# Patient Record
Sex: MALE | Race: WHITE | Employment: FULL TIME | URBAN - METROPOLITAN AREA
[De-identification: names, ages, dates, MRNs, and addresses within clinical notes are randomized per-mention and may not be internally consistent; named-entity substitution may affect disease eponyms.]

---

## 2017-01-03 ENCOUNTER — OFFICE VISIT (OUTPATIENT)
Dept: GASTROENTEROLOGY | Facility: CLINIC | Age: 52
End: 2017-01-03

## 2017-01-03 VITALS
DIASTOLIC BLOOD PRESSURE: 77 MMHG | SYSTOLIC BLOOD PRESSURE: 127 MMHG | HEIGHT: 69 IN | WEIGHT: 171 LBS | BODY MASS INDEX: 25.33 KG/M2 | HEART RATE: 61 BPM

## 2017-01-03 DIAGNOSIS — Z12.11 COLON CANCER SCREENING: Primary | ICD-10-CM

## 2017-01-03 PROCEDURE — 99243 OFF/OP CNSLTJ NEW/EST LOW 30: CPT | Performed by: INTERNAL MEDICINE

## 2017-01-03 PROCEDURE — 99212 OFFICE O/P EST SF 10 MIN: CPT | Performed by: INTERNAL MEDICINE

## 2017-01-03 NOTE — PROGRESS NOTES
Suresh Og is a 46year old male. HPI:   Patient presents with:  Colonoscopy Screening: No current complaints.  No prior colon      The patient is a 19-year-old male with a history of prior heavy alcohol, hemochromatosis who presents for colon ca organosplenomegly  Ext- no clubbing or cyanosis  Skin- no rashes or lesions  Neuro- appropriate response, alert, no confusion  .   ASSESSMENT/PLAN:   Assessment  Colon cancer screening    The patient is a 78-year-old male who appears to be of average risk f

## 2017-01-20 ENCOUNTER — OFFICE VISIT (OUTPATIENT)
Dept: FAMILY MEDICINE CLINIC | Facility: CLINIC | Age: 52
End: 2017-01-20

## 2017-01-20 VITALS
DIASTOLIC BLOOD PRESSURE: 78 MMHG | HEART RATE: 69 BPM | SYSTOLIC BLOOD PRESSURE: 134 MMHG | WEIGHT: 172 LBS | BODY MASS INDEX: 25 KG/M2 | TEMPERATURE: 98 F

## 2017-01-20 DIAGNOSIS — M54.2 CERVICALGIA: Primary | ICD-10-CM

## 2017-01-20 PROCEDURE — 99212 OFFICE O/P EST SF 10 MIN: CPT | Performed by: FAMILY MEDICINE

## 2017-01-20 PROCEDURE — 99213 OFFICE O/P EST LOW 20 MIN: CPT | Performed by: FAMILY MEDICINE

## 2017-01-20 RX ORDER — BACLOFEN 10 MG/1
10 TABLET ORAL 2 TIMES DAILY
Qty: 20 TABLET | Refills: 0 | Status: SHIPPED | OUTPATIENT
Start: 2017-01-20 | End: 2018-05-08

## 2017-01-20 NOTE — PROGRESS NOTES
HPI:    Patient ID: Yinka Leos is a 46year old male. HPI  Patient presents with:  Neck Pain: c/o left side neck pain    Review of Systems   Constitutional: Negative. Musculoskeletal: Positive for neck pain.    Neurological: Negative for weakn

## 2017-03-10 ENCOUNTER — ANESTHESIA (OUTPATIENT)
Dept: ENDOSCOPY | Facility: HOSPITAL | Age: 52
End: 2017-03-10
Payer: COMMERCIAL

## 2017-03-10 ENCOUNTER — SURGERY (OUTPATIENT)
Age: 52
End: 2017-03-10

## 2017-03-10 ENCOUNTER — HOSPITAL ENCOUNTER (OUTPATIENT)
Facility: HOSPITAL | Age: 52
Setting detail: HOSPITAL OUTPATIENT SURGERY
Discharge: HOME | End: 2017-03-10
Attending: INTERNAL MEDICINE | Admitting: INTERNAL MEDICINE
Payer: COMMERCIAL

## 2017-03-10 ENCOUNTER — ANESTHESIA EVENT (OUTPATIENT)
Dept: ENDOSCOPY | Facility: HOSPITAL | Age: 52
End: 2017-03-10
Payer: COMMERCIAL

## 2017-03-10 VITALS
RESPIRATION RATE: 14 BRPM | HEART RATE: 55 BPM | WEIGHT: 170 LBS | OXYGEN SATURATION: 98 % | SYSTOLIC BLOOD PRESSURE: 102 MMHG | HEIGHT: 68 IN | BODY MASS INDEX: 25.76 KG/M2 | DIASTOLIC BLOOD PRESSURE: 60 MMHG | TEMPERATURE: 97 F

## 2017-03-10 DIAGNOSIS — Z12.11 ENCOUNTER FOR SCREENING COLONOSCOPY: Primary | ICD-10-CM

## 2017-03-10 PROCEDURE — 45385 COLONOSCOPY W/LESION REMOVAL: CPT | Performed by: INTERNAL MEDICINE

## 2017-03-10 PROCEDURE — 0DBN8ZX EXCISION OF SIGMOID COLON, VIA NATURAL OR ARTIFICIAL OPENING ENDOSCOPIC, DIAGNOSTIC: ICD-10-PCS | Performed by: INTERNAL MEDICINE

## 2017-03-10 RX ORDER — SODIUM CHLORIDE, SODIUM LACTATE, POTASSIUM CHLORIDE, CALCIUM CHLORIDE 600; 310; 30; 20 MG/100ML; MG/100ML; MG/100ML; MG/100ML
INJECTION, SOLUTION INTRAVENOUS CONTINUOUS
Status: DISCONTINUED | OUTPATIENT
Start: 2017-03-10 | End: 2017-03-10

## 2017-03-10 RX ORDER — MIDAZOLAM HYDROCHLORIDE 1 MG/ML
1 INJECTION INTRAMUSCULAR; INTRAVENOUS EVERY 5 MIN PRN
Status: DISCONTINUED | OUTPATIENT
Start: 2017-03-10 | End: 2017-03-10

## 2017-03-10 RX ORDER — SODIUM CHLORIDE 0.9 % (FLUSH) 0.9 %
10 SYRINGE (ML) INJECTION AS NEEDED
Status: DISCONTINUED | OUTPATIENT
Start: 2017-03-10 | End: 2017-03-10

## 2017-03-10 RX ORDER — SODIUM CHLORIDE, SODIUM LACTATE, POTASSIUM CHLORIDE, CALCIUM CHLORIDE 600; 310; 30; 20 MG/100ML; MG/100ML; MG/100ML; MG/100ML
INJECTION, SOLUTION INTRAVENOUS CONTINUOUS PRN
Status: DISCONTINUED | OUTPATIENT
Start: 2017-03-10 | End: 2017-03-10 | Stop reason: SURG

## 2017-03-10 RX ORDER — ONDANSETRON 2 MG/ML
4 INJECTION INTRAMUSCULAR; INTRAVENOUS ONCE AS NEEDED
Status: DISCONTINUED | OUTPATIENT
Start: 2017-03-10 | End: 2017-03-10

## 2017-03-10 RX ADMIN — SODIUM CHLORIDE, SODIUM LACTATE, POTASSIUM CHLORIDE, CALCIUM CHLORIDE: 600; 310; 30; 20 INJECTION, SOLUTION INTRAVENOUS at 09:26:00

## 2017-03-10 RX ADMIN — SODIUM CHLORIDE, SODIUM LACTATE, POTASSIUM CHLORIDE, CALCIUM CHLORIDE: 600; 310; 30; 20 INJECTION, SOLUTION INTRAVENOUS at 09:57:00

## 2017-03-10 RX ADMIN — SODIUM CHLORIDE, SODIUM LACTATE, POTASSIUM CHLORIDE, CALCIUM CHLORIDE: 600; 310; 30; 20 INJECTION, SOLUTION INTRAVENOUS at 09:25:00

## 2017-03-10 NOTE — H&P
History & Physical Examination    Patient Name: Helen Borden  MRN: [id number]  CSN: [id number]  YOB: 1965    Diagnosis: colon screening    Present Illness: see chart      Prescriptions prior to admission:  baclofen 10 MG Oral Tab Take 1

## 2017-03-10 NOTE — ANESTHESIA POSTPROCEDURE EVALUATION
Patient: Arslan Traylor [facility]    Procedure Summary     Date Anesthesia Start Anesthesia Stop Room / Location    03/10/17 0926  [facility] ENDOSCOPY 01 / [facility] ENDOSCOPY       Procedure Diagnosis Surgeon Responsible Provider    COLONOSCOPY (N/A ) Special screening for

## 2017-03-10 NOTE — ANESTHESIA PREPROCEDURE EVALUATION
Anesthesia PreOp Note    HPI:     Sebastián Bobo is a 46year old male who presents for preoperative consultation requested by: Olivia Gtz MD    Date of Surgery: 3/10/2017    Procedure(s):  COLONOSCOPY  Indication: Special screening for [facility] Day Smoker  1.00 Packs/Day     Types: Cigarettes    Smokeless tobacco: Not on file    Alcohol Use: No    Drug Use: No    Sexual Activity: Not on file   Not on file  Other Topics Concern    Caffeine Concern Not Asked    Comment: coffee, 4cups/day     Social

## 2017-03-10 NOTE — OPERATIVE REPORT
[facility] HOSP - [facility] Endoscopy Report      Preoperative Diagnosis:  - colon screening      Postoperative Diagnosis:  - small external anal lesion  - diverticulosis  - colon polyp x1   - hemorrhoids      Procedure:    Colonoscopy         Surgeon:  Citlalli Erazo

## 2017-03-15 RX ORDER — SIMVASTATIN 40 MG
TABLET ORAL
Qty: 30 TABLET | Refills: 0 | Status: SHIPPED | OUTPATIENT
Start: 2017-03-15 | End: 2018-08-09

## 2017-03-15 NOTE — TELEPHONE ENCOUNTER
Anna/Dax[facility] [phone] is calling for status of medication refill request. Pt is out of medication.

## 2017-03-20 ENCOUNTER — TELEPHONE (OUTPATIENT)
Dept: GASTROENTEROLOGY | Facility: CLINIC | Age: 52
End: 2017-03-20

## 2017-03-20 NOTE — TELEPHONE ENCOUNTER
----- Message from Yesenia Shaw MD sent at 3/17/2017  6:36 PM CDT -----  RN to place on call back for colonoscopy for 10 years and mail letter to the pt.

## 2017-05-08 ENCOUNTER — OFFICE VISIT (OUTPATIENT)
Dept: DERMATOLOGY CLINIC | Facility: CLINIC | Age: 52
End: 2017-05-08

## 2017-05-08 DIAGNOSIS — D23.60 BENIGN NEOPLASM OF SKIN OF UPPER LIMB, INCLUDING SHOULDER, UNSPECIFIED LATERALITY: ICD-10-CM

## 2017-05-08 DIAGNOSIS — D23.70 BENIGN NEOPLASM OF SKIN OF LOWER LIMB, INCLUDING HIP, UNSPECIFIED LATERALITY: ICD-10-CM

## 2017-05-08 DIAGNOSIS — D23.5 BENIGN NEOPLASM OF SKIN OF TRUNK, EXCEPT SCROTUM: ICD-10-CM

## 2017-05-08 DIAGNOSIS — D23.30 BENIGN NEOPLASM OF SKIN OF FACE: ICD-10-CM

## 2017-05-08 DIAGNOSIS — D22.9 MULTIPLE NEVI: Primary | ICD-10-CM

## 2017-05-08 DIAGNOSIS — D23.4 BENIGN NEOPLASM OF SCALP AND SKIN OF NECK: ICD-10-CM

## 2017-05-08 PROCEDURE — 99202 OFFICE O/P NEW SF 15 MIN: CPT | Performed by: DERMATOLOGY

## 2017-05-08 PROCEDURE — 99212 OFFICE O/P EST SF 10 MIN: CPT | Performed by: DERMATOLOGY

## 2017-05-24 NOTE — PROGRESS NOTES
Diego Mora is a 46year old male. HPI:     CC:  Patient presents with:  Lesion: new pt. presents with bumpy lesions to scalp, nose and penis. no discomfort.  personal hx of BCC        Allergies:  Review of patient's allergies indicates no known all Packs/Day     Types: Cigarettes    Smokeless tobacco: Not on file    Alcohol Use: No    Drug Use: No    Sexual Activity: Not on file   Not on file  Other Topics Concern    Caffeine Concern Not Asked    Comment: coffee, 4cups/day    Pt has a pacemaker No plan:    No orders of the defined types were placed in this encounter.        Meds & Refills for this Visit:  No prescriptions requested or ordered in this encounter      Multiple nevi  (primary encounter diagnosis)  Benign neoplasm of scalp and skin of nec

## 2018-05-08 ENCOUNTER — OFFICE VISIT (OUTPATIENT)
Dept: FAMILY MEDICINE CLINIC | Facility: CLINIC | Age: 53
End: 2018-05-08

## 2018-05-08 VITALS
SYSTOLIC BLOOD PRESSURE: 130 MMHG | TEMPERATURE: 98 F | DIASTOLIC BLOOD PRESSURE: 80 MMHG | WEIGHT: 174 LBS | HEART RATE: 70 BPM | BODY MASS INDEX: 26 KG/M2

## 2018-05-08 DIAGNOSIS — G44.40 REBOUND HEADACHE: Primary | ICD-10-CM

## 2018-05-08 DIAGNOSIS — F17.210 CIGARETTE NICOTINE DEPENDENCE WITHOUT COMPLICATION: ICD-10-CM

## 2018-05-08 PROCEDURE — 99214 OFFICE O/P EST MOD 30 MIN: CPT | Performed by: FAMILY MEDICINE

## 2018-05-08 PROCEDURE — 99212 OFFICE O/P EST SF 10 MIN: CPT | Performed by: FAMILY MEDICINE

## 2018-05-08 NOTE — PROGRESS NOTES
2018 11:37 AM    Felix Jett YOB: 1965  Patient presents with:  Headache: X 1 month  Sore Throat: pt states its irritated    HPI:     Td Castro is a 46year old male who presents for evaluation of a chief complaint of sore throat Surgical History: Past Surgical History:  3/10/2017: COLONOSCOPY N/A      Comment: Procedure: COLONOSCOPY;  Surgeon: Gisselle Maldonado MD;  Location: [facility] ENDOSCOPY    Social History:     Social History  Social History   Marital status: Lesly exudates  LUNGS: clear to auscultation bilaterally; no rales, rhonchi, or wheezes  ABDOMINAL: Soft NABS, ND, NT    Labs performed this visit:  No results found for this or any previous visit (from the past 10 hour(s)).     MDM/Assessment/Plan:   Assessment any questions related to insurance coverage. tramadol-acetaminophen 37.5-325 MG Oral Tab          Sig: Take 1 tablet by mouth every 6 (six) hours as needed for Pain. Take it with food.           Dispense:  60

## 2018-05-16 ENCOUNTER — HOSPITAL ENCOUNTER (OUTPATIENT)
Dept: MRI IMAGING | Age: 53
Discharge: HOME | End: 2018-05-16
Attending: FAMILY MEDICINE
Payer: COMMERCIAL

## 2018-05-16 DIAGNOSIS — G44.40 REBOUND HEADACHE: ICD-10-CM

## 2018-05-16 PROCEDURE — 70551 MRI BRAIN STEM W/O DYE: CPT | Performed by: FAMILY MEDICINE

## 2018-05-19 ENCOUNTER — TELEPHONE (OUTPATIENT)
Dept: FAMILY MEDICINE CLINIC | Facility: CLINIC | Age: 53
End: 2018-05-19

## 2018-05-21 NOTE — TELEPHONE ENCOUNTER
Pt called in and CSS attempted to schedule appt. Pt's work schedule is very hectic and cannot miss work. Pt states he is usually home around 4:30pm every night. Pt states he will figure out if he is off next week to try and schedule again.

## 2018-06-14 NOTE — TELEPHONE ENCOUNTER
Patient came to [facility] today thinking his appt with Dr. Priyanka Jay was today. When  explained it was not and Dr. Priyanka Jay is not in today, he asked for a copy of his test results.  I spoke with patient in an exam room and went over his results with him

## 2018-08-07 ENCOUNTER — OFFICE VISIT (OUTPATIENT)
Dept: FAMILY MEDICINE CLINIC | Facility: CLINIC | Age: 53
End: 2018-08-07
Payer: COMMERCIAL

## 2018-08-07 VITALS
HEIGHT: 69 IN | BODY MASS INDEX: 24.44 KG/M2 | SYSTOLIC BLOOD PRESSURE: 125 MMHG | DIASTOLIC BLOOD PRESSURE: 73 MMHG | WEIGHT: 165 LBS | HEART RATE: 75 BPM | TEMPERATURE: 99 F

## 2018-08-07 DIAGNOSIS — R51.9 CHRONIC NONINTRACTABLE HEADACHE, UNSPECIFIED HEADACHE TYPE: ICD-10-CM

## 2018-08-07 DIAGNOSIS — J39.2 THROAT DISORDER: Primary | ICD-10-CM

## 2018-08-07 DIAGNOSIS — H52.4 PRESBYOPIA: ICD-10-CM

## 2018-08-07 DIAGNOSIS — G89.29 CHRONIC NONINTRACTABLE HEADACHE, UNSPECIFIED HEADACHE TYPE: ICD-10-CM

## 2018-08-07 DIAGNOSIS — Z72.0 TOBACCO ABUSE: ICD-10-CM

## 2018-08-07 PROCEDURE — 99212 OFFICE O/P EST SF 10 MIN: CPT | Performed by: FAMILY MEDICINE

## 2018-08-07 RX ORDER — OMEPRAZOLE 40 MG/1
40 CAPSULE, DELAYED RELEASE ORAL DAILY
Qty: 30 CAPSULE | Refills: 0 | Status: SHIPPED | OUTPATIENT
Start: 2018-08-07 | End: 2019-07-13

## 2018-08-07 RX ORDER — TOPIRAMATE 25 MG/1
25 TABLET ORAL 2 TIMES DAILY
Qty: 60 TABLET | Refills: 1 | Status: SHIPPED | OUTPATIENT
Start: 2018-08-07 | End: 2018-09-28

## 2018-08-07 NOTE — PROGRESS NOTES
hadaches for 3 months. \"at least   Behind the eyes  Had throat pain  It kinds of moves around. Quit smoking last week. Has been taking a lot of aspirin. weres cheaters. No trouble swallowing  Feels like the throat is tight.   Some acid reflux on oc tablet; Refill: 1  - OPHTHALMOLOGY - INTERNAL    3. Tobacco abuse  Doing well off med    4.  Presbyopia  Referral.  - OPHTHALMOLOGY - INTERNAL

## 2018-08-09 ENCOUNTER — OFFICE VISIT (OUTPATIENT)
Dept: OTOLARYNGOLOGY | Facility: CLINIC | Age: 53
End: 2018-08-09
Payer: COMMERCIAL

## 2018-08-09 VITALS
HEIGHT: 69 IN | SYSTOLIC BLOOD PRESSURE: 112 MMHG | BODY MASS INDEX: 24.44 KG/M2 | WEIGHT: 165 LBS | DIASTOLIC BLOOD PRESSURE: 64 MMHG

## 2018-08-09 DIAGNOSIS — R07.0 THROAT PAIN IN ADULT: Primary | ICD-10-CM

## 2018-08-09 PROCEDURE — 99212 OFFICE O/P EST SF 10 MIN: CPT | Performed by: OTOLARYNGOLOGY

## 2018-08-09 PROCEDURE — 99243 OFF/OP CNSLTJ NEW/EST LOW 30: CPT | Performed by: OTOLARYNGOLOGY

## 2018-08-09 PROCEDURE — 31575 DIAGNOSTIC LARYNGOSCOPY: CPT | Performed by: OTOLARYNGOLOGY

## 2018-08-09 RX ORDER — MONTELUKAST SODIUM 10 MG/1
10 TABLET ORAL NIGHTLY
Qty: 30 TABLET | Refills: 3 | Status: SHIPPED | OUTPATIENT
Start: 2018-08-09 | End: 2019-07-13

## 2018-08-09 RX ORDER — AZELASTINE 1 MG/ML
2 SPRAY, METERED NASAL 2 TIMES DAILY
Qty: 1 BOTTLE | Refills: 0 | Status: SHIPPED | OUTPATIENT
Start: 2018-08-09 | End: 2018-09-30

## 2018-08-09 NOTE — PROGRESS NOTES
Roxanna Crowley is a 46year old male. Patient presents with:  Sore Throat: Pt presents with sore throat and headache x 3 months. Taking  Advil with some relief. hx of smoking x 40 years.  Quit date 8/4/18      HISTORY OF PRESENT ILLNESS  He presents wi Location: [address] [place] [address] ENDOSCOPY      REVIEW OF SYSTEMS    System Neg/Pos Details   Constitutional Negative Fatigue, fever and weight loss. ENMT Negative Drooling. Eyes Negative Blurred vision and vision changes. Respiratory Negative Dyspnea and wheezing.    C Nasal mucosa–congested   Procedures:  Endoscopy/Laryngoscopy  Pre-Procedure Care: Verbal consent was obtained. Procedure/risks were explained. Questions were answered. Correct patient identified. Correct side and site confirmed. A topical spray of ). 2

## 2018-09-28 DIAGNOSIS — R51.9 CHRONIC NONINTRACTABLE HEADACHE, UNSPECIFIED HEADACHE TYPE: ICD-10-CM

## 2018-09-28 DIAGNOSIS — G89.29 CHRONIC NONINTRACTABLE HEADACHE, UNSPECIFIED HEADACHE TYPE: ICD-10-CM

## 2018-09-29 RX ORDER — TOPIRAMATE 25 MG/1
TABLET ORAL
Qty: 60 TABLET | Refills: 0 | Status: SHIPPED | OUTPATIENT
Start: 2018-09-29 | End: 2019-07-13

## 2018-09-29 NOTE — TELEPHONE ENCOUNTER
Refill Protocol Appointment Criteria  · Appointment scheduled in the past 6 months or in the next 3 months  Recent Outpatient Visits            1 month ago Throat pain in adult    [facility] [place], [address] Andrea Raja, MD

## 2018-10-01 RX ORDER — AZELASTINE 1 MG/ML
SPRAY, METERED NASAL
Qty: 30 ML | Refills: 1 | Status: SHIPPED | OUTPATIENT
Start: 2018-10-01 | End: 2019-07-13

## 2019-06-24 ENCOUNTER — NURSE TRIAGE (OUTPATIENT)
Dept: FAMILY MEDICINE CLINIC | Facility: CLINIC | Age: 54
End: 2019-06-24

## 2019-06-24 NOTE — TELEPHONE ENCOUNTER
Action Requested: Summary for Provider     []  Critical Lab, Recommendations Needed  [] Need Additional Advice  []   FYI    []   Need Orders  [] Need Medications Sent to Pharmacy  []  Other     SUMMARY: Pt states symptoms are not serious and just wants to

## 2019-07-13 NOTE — PROGRESS NOTES
\"I'm a [de-identified] old man. I have no patience. Littlest things spark me off. I quit smoking last summer but I think it helped me. I'm depressed.   \"  More than 6 mo  Quit drinking 9 years in oct  Quit smoking last summer      Psychiatric exam:  Dress was time was spent in discussion.

## 2019-09-05 RX ORDER — CITALOPRAM 10 MG/1
TABLET ORAL
Qty: 90 TABLET | Refills: 0 | Status: SHIPPED | OUTPATIENT
Start: 2019-09-05 | End: 2019-09-30

## 2019-09-30 NOTE — PROGRESS NOTES
Less grumpy   Feeling better on 10 but upped to 20 over the last week  Dress was appropriate. Speech:  rate normal, volume was appropriate articulation normal, patient was coherent.    Language:  no paucity of language no perseveration     Thought  Proce

## 2019-10-14 NOTE — PROGRESS NOTES
Pt feeling happy  Less irritablilty   \"I'm less grouchy. \"    Osvaldo Ree was appropriate. Speech:  rate normal, volume was appropriate articulation normal, patient was coherent.    Language:  no paucity of language no perseveration     Thought  Processes: ra

## 2020-03-12 NOTE — PROGRESS NOTES
It calms me down sometimes I go up to 30. Gets agitated at work. Quit smoking    Doesn't drink. Dress was appropriate. Speech:  rate normal, volume was appropriate articulation normal, patient was coherent.    Language:  no paucity of language

## 2020-08-25 ENCOUNTER — OFFICE VISIT (OUTPATIENT)
Dept: FAMILY MEDICINE CLINIC | Facility: CLINIC | Age: 55
End: 2020-08-25
Payer: COMMERCIAL

## 2020-08-25 VITALS
BODY MASS INDEX: 27.85 KG/M2 | SYSTOLIC BLOOD PRESSURE: 107 MMHG | WEIGHT: 188 LBS | DIASTOLIC BLOOD PRESSURE: 65 MMHG | HEIGHT: 69 IN | HEART RATE: 58 BPM

## 2020-08-25 DIAGNOSIS — E78.5 HYPERLIPIDEMIA, UNSPECIFIED HYPERLIPIDEMIA TYPE: Primary | ICD-10-CM

## 2020-08-25 PROCEDURE — 99213 OFFICE O/P EST LOW 20 MIN: CPT | Performed by: FAMILY MEDICINE

## 2020-08-25 PROCEDURE — 3074F SYST BP LT 130 MM HG: CPT | Performed by: FAMILY MEDICINE

## 2020-08-25 PROCEDURE — 3008F BODY MASS INDEX DOCD: CPT | Performed by: FAMILY MEDICINE

## 2020-08-25 PROCEDURE — 3078F DIAST BP <80 MM HG: CPT | Performed by: FAMILY MEDICINE

## 2020-08-25 RX ORDER — CITALOPRAM 40 MG/1
40 TABLET ORAL DAILY
Qty: 90 TABLET | Refills: 1 | Status: SHIPPED | OUTPATIENT
Start: 2020-08-25 | End: 2021-02-16

## 2020-08-25 NOTE — PROGRESS NOTES
Blood pressure 107/65, pulse 58, height 5' 9\" (1.753 m), weight 188 lb (85.3 kg). Presents today following up for depression and irritability. Reports that he feels better on citalopram no suicidality. Denies any substance abuse.     Will be leaving t

## 2020-08-31 ENCOUNTER — LAB ENCOUNTER (OUTPATIENT)
Dept: LAB | Age: 55
End: 2020-08-31
Attending: FAMILY MEDICINE
Payer: COMMERCIAL

## 2020-08-31 DIAGNOSIS — E78.5 HYPERLIPIDEMIA, UNSPECIFIED HYPERLIPIDEMIA TYPE: ICD-10-CM

## 2020-08-31 LAB
ALBUMIN SERPL-MCNC: 3.8 G/DL (ref 3.4–5)
ALBUMIN/GLOB SERPL: 1.1 {RATIO} (ref 1–2)
ALP LIVER SERPL-CCNC: 64 U/L (ref 45–117)
ALT SERPL-CCNC: 27 U/L (ref 16–61)
ANION GAP SERPL CALC-SCNC: 5 MMOL/L (ref 0–18)
AST SERPL-CCNC: 19 U/L (ref 15–37)
BILIRUB SERPL-MCNC: 0.6 MG/DL (ref 0.1–2)
BUN BLD-MCNC: 15 MG/DL (ref 7–18)
BUN/CREAT SERPL: 15 (ref 10–20)
CALCIUM BLD-MCNC: 8.8 MG/DL (ref 8.5–10.1)
CHLORIDE SERPL-SCNC: 110 MMOL/L (ref 98–112)
CHOLEST SMN-MCNC: 214 MG/DL (ref ?–200)
CO2 SERPL-SCNC: 26 MMOL/L (ref 21–32)
COMPLEXED PSA SERPL-MCNC: 2.59 NG/ML (ref ?–4)
CREAT BLD-MCNC: 1 MG/DL (ref 0.7–1.3)
GLOBULIN PLAS-MCNC: 3.4 G/DL (ref 2.8–4.4)
GLUCOSE BLD-MCNC: 93 MG/DL (ref 70–99)
HDLC SERPL-MCNC: 56 MG/DL (ref 40–59)
LDLC SERPL CALC-MCNC: 141 MG/DL (ref ?–100)
M PROTEIN MFR SERPL ELPH: 7.2 G/DL (ref 6.4–8.2)
NONHDLC SERPL-MCNC: 158 MG/DL (ref ?–130)
OSMOLALITY SERPL CALC.SUM OF ELEC: 293 MOSM/KG (ref 275–295)
PATIENT FASTING Y/N/NP: YES
PATIENT FASTING Y/N/NP: YES
POTASSIUM SERPL-SCNC: 4.2 MMOL/L (ref 3.5–5.1)
SODIUM SERPL-SCNC: 141 MMOL/L (ref 136–145)
TRIGL SERPL-MCNC: 85 MG/DL (ref 30–149)
TSI SER-ACNC: 0.39 MIU/ML (ref 0.36–3.74)
VLDLC SERPL CALC-MCNC: 17 MG/DL (ref 0–30)

## 2020-08-31 PROCEDURE — 36415 COLL VENOUS BLD VENIPUNCTURE: CPT

## 2020-08-31 PROCEDURE — 80053 COMPREHEN METABOLIC PANEL: CPT

## 2020-08-31 PROCEDURE — 80061 LIPID PANEL: CPT

## 2020-08-31 PROCEDURE — 84443 ASSAY THYROID STIM HORMONE: CPT

## 2020-11-18 NOTE — MR AVS SNAPSHOT
[facility] hospital - [place] [address]  [id number] [phone]               Thank you for choosing us for your health care visit with Guerrero Nunez MD.  We are glad to serve you and happy to provide you with this summary of Educational Information     Healthy Diet and Regular Exercise  [employer] [facility] [place] for making healthy food choices  -   Enjoy your food, but eat less. Fully enjoy your food when eating. Don’t eat while distracted and slow down. n/a

## 2021-02-16 RX ORDER — CITALOPRAM 40 MG/1
40 TABLET ORAL DAILY
Qty: 90 TABLET | Refills: 1 | Status: SHIPPED | OUTPATIENT
Start: 2021-02-16 | End: 2021-08-17

## 2021-02-16 NOTE — TELEPHONE ENCOUNTER
Citalopram Hydrobromide 40 MG Oral Tab    He states he know he do for a physical but been busy working,      Patient states he need a refill on medication

## 2021-06-17 ENCOUNTER — OFFICE VISIT (OUTPATIENT)
Dept: DERMATOLOGY CLINIC | Facility: CLINIC | Age: 56
End: 2021-06-17
Payer: COMMERCIAL

## 2021-06-17 DIAGNOSIS — D48.5 NEOPLASM OF UNCERTAIN BEHAVIOR OF SKIN: Primary | ICD-10-CM

## 2021-06-17 DIAGNOSIS — Z85.828 PERSONAL HISTORY OF SKIN CANCER: ICD-10-CM

## 2021-06-17 DIAGNOSIS — L57.8 SUN-DAMAGED SKIN: ICD-10-CM

## 2021-06-17 DIAGNOSIS — L91.8 SKIN TAG: ICD-10-CM

## 2021-06-17 DIAGNOSIS — D23.5 BENIGN NEOPLASM OF SKIN OF TRUNK, EXCEPT SCROTUM: ICD-10-CM

## 2021-06-17 DIAGNOSIS — L81.4 SOLAR LENTIGO: ICD-10-CM

## 2021-06-17 PROCEDURE — 99203 OFFICE O/P NEW LOW 30 MIN: CPT | Performed by: DERMATOLOGY

## 2021-06-17 PROCEDURE — 11103 TANGNTL BX SKIN EA SEP/ADDL: CPT | Performed by: DERMATOLOGY

## 2021-06-17 PROCEDURE — 11102 TANGNTL BX SKIN SINGLE LES: CPT | Performed by: DERMATOLOGY

## 2021-06-17 PROCEDURE — 88305 TISSUE EXAM BY PATHOLOGIST: CPT | Performed by: DERMATOLOGY

## 2021-06-17 NOTE — PROGRESS NOTES
HPI:     Chief Complaint     Lesion        HPI     Lesion      Additional comments: LOV 5/8/17. pt presenting today with lesion to Middle of back for a year. Denies pain or itching. Not sure if lesion haschanged in size or color.  pt has HX BCC          La History      Marital status: Single      Spouse name: Not on file      Number of children: Not on file      Years of education: Not on file      Highest education level: Not on file    Occupational History      Not on file    Tobacco Use      Smoking statu Services:       Active Member of Clubs or Organizations:       Attends Club or Organization Meetings:       Marital Status:   Intimate Partner Violence:       Fear of Current or Ex-Partner:       Emotionally Abused:       Physically Abused:       Sexually to reapply it every few hours. Skin tag-reassurance–no treatment  Benign neoplasm of skin of trunk, except scrotum-ABCDE's of melanoma discussed. the patient is to follow up yearly. Monthly self exams.  The patient will come sooner should they note  anyt

## 2021-06-21 NOTE — PROGRESS NOTES
Results logged in. Patient informed of test results and all LSS' recommendations. Voiced understanding.   Appt made

## 2021-08-05 ENCOUNTER — OFFICE VISIT (OUTPATIENT)
Dept: DERMATOLOGY CLINIC | Facility: CLINIC | Age: 56
End: 2021-08-05
Payer: COMMERCIAL

## 2021-08-05 VITALS — DIASTOLIC BLOOD PRESSURE: 72 MMHG | HEART RATE: 64 BPM | SYSTOLIC BLOOD PRESSURE: 122 MMHG

## 2021-08-05 DIAGNOSIS — C44.519 BASAL CELL CARCINOMA (BCC) OF UPPER BACK: Primary | ICD-10-CM

## 2021-08-05 PROCEDURE — 3078F DIAST BP <80 MM HG: CPT | Performed by: DERMATOLOGY

## 2021-08-05 PROCEDURE — 11603 EXC TR-EXT MAL+MARG 2.1-3 CM: CPT | Performed by: DERMATOLOGY

## 2021-08-05 PROCEDURE — 88305 TISSUE EXAM BY PATHOLOGIST: CPT | Performed by: DERMATOLOGY

## 2021-08-05 PROCEDURE — 3074F SYST BP LT 130 MM HG: CPT | Performed by: DERMATOLOGY

## 2021-08-05 PROCEDURE — 12032 INTMD RPR S/A/T/EXT 2.6-7.5: CPT | Performed by: DERMATOLOGY

## 2021-08-05 NOTE — PROGRESS NOTES
HPI:     Chief Complaint     Procedure        HPI     Procedure      Additional comments: pt is here for procedure of excision of upper RT medline back           Last edited by Moise Troncoso on 8/5/2021  1:37 PM. (History)        Patient presents for Stress Concern: Not Asked        Weight Concern: Not Asked        Special Diet: Not Asked        Back Care: Not Asked        Exercise: Not Asked        Bike Helmet: Not Asked        Seat Belt: Not Asked        Self-Exams: Not Asked        Grew up on a fa See the operative note. All consents were signed. All postop instructions given. Patient tolerated procedure well. Patient is to follow up in 11for suture removal.  Patient will call if  any interim problems or concerns.     Orders Placed This Encounter

## 2021-08-16 ENCOUNTER — OFFICE VISIT (OUTPATIENT)
Dept: DERMATOLOGY CLINIC | Facility: CLINIC | Age: 56
End: 2021-08-16
Payer: COMMERCIAL

## 2021-08-16 DIAGNOSIS — Z48.02 VISIT FOR SUTURE REMOVAL: Primary | ICD-10-CM

## 2021-08-16 NOTE — PROGRESS NOTES
HPI:       Patient presents for suture removal status post excision basal cell carcinoma from the back. Histopathology reveals residual basal cell carcinoma with all negative margins. Patient denies problems in the postop period.         Review of Systems Not Asked        Bike Helmet: Not Asked        Seat Belt: Not Asked        Self-Exams: Not Asked        Grew up on a farm: Not Asked        History of tanning: Not Asked        Outdoor occupation: Not Asked        Pt has a pacemaker: No        Pt has a def months. Will call if any interim problems or concerns. No orders of the defined types were placed in this encounter. Results From Past 48 Hours:  No results found for this or any previous visit (from the past 48 hour(s)).     Meds This Visit:

## 2021-08-17 RX ORDER — CITALOPRAM 40 MG/1
40 TABLET ORAL DAILY
Qty: 90 TABLET | Refills: 1 | Status: SHIPPED | OUTPATIENT
Start: 2021-08-17 | End: 2022-02-12

## 2022-02-12 RX ORDER — CITALOPRAM 40 MG/1
40 TABLET ORAL DAILY
Qty: 90 TABLET | Refills: 0 | Status: SHIPPED | OUTPATIENT
Start: 2022-02-12 | End: 2022-05-17

## 2022-02-12 NOTE — TELEPHONE ENCOUNTER
Refill passed per [place] [employer] protocol.     Requested Prescriptions   Pending Prescriptions Disp Refills    CITALOPRAM 40 MG Oral Tab [Pharmacy Med Name: CITALOPRAM 40MG TABLETS] 90 tablet 1     Sig: TAKE 1 TABLET(40 MG) BY MOUTH DAILY        Psychiatric Non-Scheduled (Anti-Anxiety) Failed - 2/12/2022 10:28 AM        Failed - Appointment in last 6 or next 3 months                  Recent Outpatient Visits              6 months ago Visit for suture removal    [facility] Sandra Escoto MD    Office Visit    6 months ago Basal cell carcinoma [facility]) of upper back    [facility] Sandra Escoto MD    Office Visit    8 months ago Neoplasm of uncertain behavior of skin    [facility] Sandra Escoto MD    Office Visit    1 year ago Hyperlipidemia, unspecified hyperlipidemia type    [facility] [facility], [address], DO    Office Visit    1 year ago Current mild episode of major depressive disorder without prior episode [facility])    [address] [place] [address], DO    Office Visit

## 2022-05-17 RX ORDER — CITALOPRAM 40 MG/1
40 TABLET ORAL DAILY
Qty: 90 TABLET | Refills: 0 | Status: SHIPPED | OUTPATIENT
Start: 2022-05-17 | End: 2022-08-11

## 2022-08-11 RX ORDER — CITALOPRAM 40 MG/1
40 TABLET ORAL DAILY
Qty: 90 TABLET | Refills: 0 | Status: SHIPPED | OUTPATIENT
Start: 2022-08-11 | End: 2022-12-01

## 2022-11-15 NOTE — PROCEDURES
Procedural Report for Elliptical Excision    Procedure: With the patient is a supine position, the skin was scrubbed with alcohol. Field block anesthesia was obtained by injecting 8 mL of 1% Xylocaine with Epinephrine.   A fusiform excision whose total Paresthesias

## 2022-12-01 ENCOUNTER — LAB ENCOUNTER (OUTPATIENT)
Dept: LAB | Age: 57
End: 2022-12-01
Attending: PHYSICIAN ASSISTANT
Payer: COMMERCIAL

## 2022-12-01 ENCOUNTER — OFFICE VISIT (OUTPATIENT)
Dept: FAMILY MEDICINE CLINIC | Facility: CLINIC | Age: 57
End: 2022-12-01
Payer: COMMERCIAL

## 2022-12-01 VITALS
HEART RATE: 57 BPM | BODY MASS INDEX: 28.73 KG/M2 | SYSTOLIC BLOOD PRESSURE: 124 MMHG | HEIGHT: 69 IN | DIASTOLIC BLOOD PRESSURE: 79 MMHG | WEIGHT: 194 LBS

## 2022-12-01 DIAGNOSIS — E55.9 VITAMIN D DEFICIENCY: ICD-10-CM

## 2022-12-01 DIAGNOSIS — Z12.5 SCREENING FOR MALIGNANT NEOPLASM OF PROSTATE: ICD-10-CM

## 2022-12-01 DIAGNOSIS — Z00.00 ROUTINE GENERAL MEDICAL EXAMINATION AT A HEALTH CARE FACILITY: Primary | ICD-10-CM

## 2022-12-01 DIAGNOSIS — Z00.00 ROUTINE GENERAL MEDICAL EXAMINATION AT A HEALTH CARE FACILITY: ICD-10-CM

## 2022-12-01 DIAGNOSIS — F32.9 REACTIVE DEPRESSION (SITUATIONAL): ICD-10-CM

## 2022-12-01 LAB
ALBUMIN SERPL-MCNC: 3.9 G/DL (ref 3.4–5)
ALBUMIN/GLOB SERPL: 1.3 {RATIO} (ref 1–2)
ALP LIVER SERPL-CCNC: 75 U/L
ALT SERPL-CCNC: 30 U/L
ANION GAP SERPL CALC-SCNC: 9 MMOL/L (ref 0–18)
AST SERPL-CCNC: 22 U/L (ref 15–37)
BASOPHILS # BLD AUTO: 0.11 X10(3) UL (ref 0–0.2)
BASOPHILS NFR BLD AUTO: 1.5 %
BILIRUB SERPL-MCNC: 0.4 MG/DL (ref 0.1–2)
BUN BLD-MCNC: 17 MG/DL (ref 7–18)
BUN/CREAT SERPL: 16.5 (ref 10–20)
CALCIUM BLD-MCNC: 8.8 MG/DL (ref 8.5–10.1)
CHLORIDE SERPL-SCNC: 107 MMOL/L (ref 98–112)
CHOLEST SERPL-MCNC: 213 MG/DL (ref ?–200)
CO2 SERPL-SCNC: 26 MMOL/L (ref 21–32)
COMPLEXED PSA SERPL-MCNC: 2.68 NG/ML (ref ?–4)
CREAT BLD-MCNC: 1.03 MG/DL
DEPRECATED RDW RBC AUTO: 42.3 FL (ref 35.1–46.3)
EOSINOPHIL # BLD AUTO: 0.67 X10(3) UL (ref 0–0.7)
EOSINOPHIL NFR BLD AUTO: 9.1 %
ERYTHROCYTE [DISTWIDTH] IN BLOOD BY AUTOMATED COUNT: 12.5 % (ref 11–15)
FASTING PATIENT LIPID ANSWER: NO
FASTING STATUS PATIENT QL REPORTED: NO
GFR SERPLBLD BASED ON 1.73 SQ M-ARVRAT: 85 ML/MIN/1.73M2 (ref 60–?)
GLOBULIN PLAS-MCNC: 3.1 G/DL (ref 2.8–4.4)
GLUCOSE BLD-MCNC: 86 MG/DL (ref 70–99)
HCT VFR BLD AUTO: 38.8 %
HDLC SERPL-MCNC: 65 MG/DL (ref 40–59)
HGB BLD-MCNC: 13.4 G/DL
IMM GRANULOCYTES # BLD AUTO: 0.01 X10(3) UL (ref 0–1)
IMM GRANULOCYTES NFR BLD: 0.1 %
LDLC SERPL CALC-MCNC: 135 MG/DL (ref ?–100)
LYMPHOCYTES # BLD AUTO: 2.71 X10(3) UL (ref 1–4)
LYMPHOCYTES NFR BLD AUTO: 36.8 %
MCH RBC QN AUTO: 31.8 PG (ref 26–34)
MCHC RBC AUTO-ENTMCNC: 34.5 G/DL (ref 31–37)
MCV RBC AUTO: 91.9 FL
MONOCYTES # BLD AUTO: 0.92 X10(3) UL (ref 0.1–1)
MONOCYTES NFR BLD AUTO: 12.5 %
NEUTROPHILS # BLD AUTO: 2.95 X10 (3) UL (ref 1.5–7.7)
NEUTROPHILS # BLD AUTO: 2.95 X10(3) UL (ref 1.5–7.7)
NEUTROPHILS NFR BLD AUTO: 40 %
NONHDLC SERPL-MCNC: 148 MG/DL (ref ?–130)
OSMOLALITY SERPL CALC.SUM OF ELEC: 295 MOSM/KG (ref 275–295)
PLATELET # BLD AUTO: 313 10(3)UL (ref 150–450)
POTASSIUM SERPL-SCNC: 4.3 MMOL/L (ref 3.5–5.1)
PROT SERPL-MCNC: 7 G/DL (ref 6.4–8.2)
RBC # BLD AUTO: 4.22 X10(6)UL
SODIUM SERPL-SCNC: 142 MMOL/L (ref 136–145)
TRIGL SERPL-MCNC: 75 MG/DL (ref 30–149)
TSI SER-ACNC: 0.97 MIU/ML (ref 0.36–3.74)
VIT D+METAB SERPL-MCNC: 18 NG/ML (ref 30–100)
VLDLC SERPL CALC-MCNC: 14 MG/DL (ref 0–30)
WBC # BLD AUTO: 7.4 X10(3) UL (ref 4–11)

## 2022-12-01 PROCEDURE — 80061 LIPID PANEL: CPT

## 2022-12-01 PROCEDURE — 80053 COMPREHEN METABOLIC PANEL: CPT

## 2022-12-01 PROCEDURE — 3008F BODY MASS INDEX DOCD: CPT | Performed by: PHYSICIAN ASSISTANT

## 2022-12-01 PROCEDURE — 3074F SYST BP LT 130 MM HG: CPT | Performed by: PHYSICIAN ASSISTANT

## 2022-12-01 PROCEDURE — 82306 VITAMIN D 25 HYDROXY: CPT

## 2022-12-01 PROCEDURE — 99396 PREV VISIT EST AGE 40-64: CPT | Performed by: PHYSICIAN ASSISTANT

## 2022-12-01 PROCEDURE — 84443 ASSAY THYROID STIM HORMONE: CPT

## 2022-12-01 PROCEDURE — 36415 COLL VENOUS BLD VENIPUNCTURE: CPT

## 2022-12-01 PROCEDURE — 3078F DIAST BP <80 MM HG: CPT | Performed by: PHYSICIAN ASSISTANT

## 2022-12-01 PROCEDURE — 85025 COMPLETE CBC W/AUTO DIFF WBC: CPT

## 2022-12-01 RX ORDER — CITALOPRAM 40 MG/1
40 TABLET ORAL DAILY
Qty: 90 TABLET | Refills: 3 | Status: SHIPPED | OUTPATIENT
Start: 2022-12-01

## 2023-05-27 ENCOUNTER — HOSPITAL ENCOUNTER (OUTPATIENT)
Age: 58
Discharge: HOME | End: 2023-05-27
Payer: COMMERCIAL

## 2023-05-27 VITALS
WEIGHT: 195 LBS | HEART RATE: 64 BPM | OXYGEN SATURATION: 98 % | TEMPERATURE: 98 F | DIASTOLIC BLOOD PRESSURE: 79 MMHG | RESPIRATION RATE: 18 BRPM | BODY MASS INDEX: 29.55 KG/M2 | HEIGHT: 68 IN | SYSTOLIC BLOOD PRESSURE: 137 MMHG

## 2023-05-27 DIAGNOSIS — L08.9 INFECTED SEBACEOUS CYST OF SKIN: Primary | ICD-10-CM

## 2023-05-27 DIAGNOSIS — L72.3 INFECTED SEBACEOUS CYST OF SKIN: Primary | ICD-10-CM

## 2023-05-27 DIAGNOSIS — L02.811 ABSCESS, SCALP: ICD-10-CM

## 2023-05-27 RX ORDER — CEPHALEXIN 500 MG/1
500 CAPSULE ORAL 3 TIMES DAILY
Qty: 21 CAPSULE | Refills: 0 | Status: SHIPPED | OUTPATIENT
Start: 2023-05-27 | End: 2023-06-03

## 2023-05-27 NOTE — ED INITIAL ASSESSMENT (HPI)
Pt presents with a lump on his scalp, states has had it for years but has been bothering him for the past 2 weeks, c/o discomfort and pressure

## 2023-09-10 DIAGNOSIS — F32.9 REACTIVE DEPRESSION (SITUATIONAL): ICD-10-CM

## 2023-09-11 RX ORDER — CITALOPRAM 40 MG/1
40 TABLET ORAL DAILY
Qty: 90 TABLET | Refills: 3 | OUTPATIENT
Start: 2023-09-11

## 2024-01-17 ENCOUNTER — OFFICE VISIT (OUTPATIENT)
Dept: DERMATOLOGY CLINIC | Facility: CLINIC | Age: 59
End: 2024-01-17

## 2024-01-17 DIAGNOSIS — D23.9 BENIGN NEOPLASM OF SKIN, UNSPECIFIED LOCATION: ICD-10-CM

## 2024-01-17 DIAGNOSIS — D48.5 NEOPLASM OF UNCERTAIN BEHAVIOR OF SKIN: Primary | ICD-10-CM

## 2024-01-17 DIAGNOSIS — Z85.828 HISTORY OF NONMELANOMA SKIN CANCER: ICD-10-CM

## 2024-01-17 DIAGNOSIS — D22.9 MULTIPLE NEVI: ICD-10-CM

## 2024-01-17 DIAGNOSIS — L57.0 AK (ACTINIC KERATOSIS): ICD-10-CM

## 2024-01-17 DIAGNOSIS — C44.519 BASAL CELL CARCINOMA (BCC) OF CHEST: ICD-10-CM

## 2024-01-17 DIAGNOSIS — L81.4 LENTIGO: ICD-10-CM

## 2024-01-17 DIAGNOSIS — L82.1 SK (SEBORRHEIC KERATOSIS): ICD-10-CM

## 2024-01-17 PROCEDURE — 88305 TISSUE EXAM BY PATHOLOGIST: CPT | Performed by: DERMATOLOGY

## 2024-01-20 NOTE — PROGRESS NOTES
The pathology report from last visit showed    , central chest, shave biopsy:  -Superficial basal cell carcinoma, extends to peripheral and deep tissue edges  E&C'ed at ov .  Please log in test results.  Please call patient and inform of results and recommendations.  (please add to history).  Pt to  rtc 4-6 mos or prn.

## 2024-01-22 NOTE — PROGRESS NOTES
Operative Report         Electrodesiccation and Curettage  Clinical diagnosis:  Size of lesion:  Location: Pt with new non-healing lesion on central chest, history of removals in the past and excessive sun..1.5cm pearly plaque at central chest  BCC,     Procedure:    With patient in appropriate position the skin of the above was scrubbed with alcohol.  Anesthesia was obtained by injecting 1% Xylocaine with epinephrine 3.0 cc.  Sharp dissection of mass initially achieved with curette.  Hemostasis and further destruction of marginal lesion provided by electrodesiccation.  This process was repeated 3 times.  The biopsy site was dressed with Polysporin and Band-Aid.  Estimated blood loss less than 2 cc.    The patient tolerated procedure well.    Complications: None    Written instructions given and reviewed by nursing staff.  Patient indicates understanding.    See pathology report if appropriate

## 2024-01-22 NOTE — PROGRESS NOTES
Results logged in path book and added to PMH. Spoke to patient. Pt verbalized understanding. Pt will call back to schedule as needed, refused a 4-6 month exam at this time.

## 2024-01-22 NOTE — PROGRESS NOTES
Morgan Jett is a 58 year old male.  HPI:     CC:    Chief Complaint   Patient presents with    Lesion     Patient present with a lesion in the middle of chest x 1 year - no pain or treatment - LOV 08-05-21 - Patient has a hx of BCC         Allergies:  Patient has no known allergies.    HISTORY:    Past Medical History:   Diagnosis Date    Basal cell carcinoma 2014    Right upper lip    BCC (basal cell carcinoma) 2021    upper back right of midline    Broken jaw (HCC)     surgical repair    Essential hypertension     Hemochromatosis     per NG: \"NOT Crohn's\"; \"phlebotomy\"    OA (osteoarthritis) of neck       Past Surgical History:   Procedure Laterality Date    COLONOSCOPY N/A 3/10/2017    Procedure: COLONOSCOPY;  Surgeon: Aime Cruz MD;  Location: [facility] ENDOSCOPY      Family History   Problem Relation Age of Onset    Cancer Father         tongue    Cancer Paternal Grandmother         stomach    Alcohol and Other Disorders Associated Other         alcoholism - paternal fam      Social History     Socioeconomic History    Marital status: Single   Tobacco Use    Smoking status: Every Day     Packs/day: 1     Types: Cigarettes     Passive exposure: Current    Smokeless tobacco: Never    Tobacco comments:     1 pack a day   Vaping Use    Vaping Use: Never used   Substance and Sexual Activity    Alcohol use: Yes     Comment: Occ    Drug use: No   Other Topics Concern    Pt has a pacemaker No    Pt has a defibrillator No    Reaction to local anesthetic No        Current Outpatient Medications   Medication Sig Dispense Refill    citalopram 40 MG Oral Tab Take 1 tablet (40 mg total) by mouth daily. 90 tablet 3     Allergies:   No Known Allergies    Past Medical History:   Diagnosis Date    Basal cell carcinoma 2014    Right upper lip    BCC (basal cell carcinoma) 2021    upper back right of midline    Broken jaw (HCC)     surgical repair    Essential hypertension     Hemochromatosis     per NG: \"NOT Crohn's\";  \"phlebotomy\"    OA (osteoarthritis) of neck      Past Surgical History:   Procedure Laterality Date    COLONOSCOPY N/A 3/10/2017    Procedure: COLONOSCOPY;  Surgeon: Aime Cruz MD;  Location: [facility] ENDOSCOPY     Social History     Socioeconomic History    Marital status: Single     Spouse name: Not on file    Number of children: Not on file    Years of education: Not on file    Highest education level: Not on file   Occupational History    Not on file   Tobacco Use    Smoking status: Every Day     Packs/day: 1     Types: Cigarettes     Passive exposure: Current    Smokeless tobacco: Never    Tobacco comments:     1 pack a day   Vaping Use    Vaping Use: Never used   Substance and Sexual Activity    Alcohol use: Yes     Comment: Occ    Drug use: No    Sexual activity: Not on file   Other Topics Concern     Service Not Asked    Blood Transfusions Not Asked    Caffeine Concern Not Asked     Comment: coffee, 4cups/day    Occupational Exposure Not Asked    Hobby Hazards Not Asked    Sleep Concern Not Asked    Stress Concern Not Asked    Weight Concern Not Asked    Special Diet Not Asked    Back Care Not Asked    Exercise Not Asked    Bike Helmet Not Asked    Seat Belt Not Asked    Self-Exams Not Asked    Grew up on a farm Not Asked    History of tanning Not Asked    Outdoor occupation Not Asked    Pt has a pacemaker No    Pt has a defibrillator No    Reaction to local anesthetic No   Social History Narrative    Not on file     Social Determinants of Health     Financial Resource Strain: Not on file   Food Insecurity: Not on file   Transportation Needs: Not on file   Physical Activity: Not on file   Stress: Not on file   Social Connections: Not on file   Housing Stability: Not on file     Family History   Problem Relation Age of Onset    Cancer Father         tongue    Cancer Paternal Grandmother         stomach    Alcohol and Other Disorders Associated Other         alcoholism - paternal fam       There were  no vitals filed for this visit.    HPI:  Chief Complaint   Patient presents with    Lesion     Patient present with a lesion in the middle of chest x 1 year - no pain or treatment - LOV 08-05-21 - Patient has a hx of BCC       LSS patient previously history of skin cancers AK's  New pearly lesion    Patient presents with concerns above.    Patient has been in their usual state of health.     Past notes/ records and appropriate/relevant lab results including pathology and past body maps reviewed. Including outside notes/ PCP notes as appropriate. Updated and new information noted in current visit.     ROS:  Denies other relevant systemic complaints.      History, medications, allergies reviewed as noted.       Physical Examination:     Well-developed well-nourished patient alert oriented in no acute distress.  Exam performed, including scalp, head, neck, face,nails, hair, external eyes, including conjunctival mucosa, eyelids, lips external ears , arms, digits,palms.     Multiple light to medium brown, well marginated, uniformly pigmented, macules and papules 6 mm and less scattered on exam. pigmented lesions examined with dermoscopy benign-appearing patterns.     Waxy tannish keratotic papules scattered, cherry-red vascular papules scattered.    See map today's date for lesions noted .  See assessment and plan below for specific lesions.    Otherwise remarkable for lesions as noted on map.    See A/P  below for additional information:    Assessment / plan:    Orders Placed This Encounter   Procedures    Specimen to Pathology, Tissue [[facility] Pt to [facility] lab]       Meds & Refills for this Visit:  Requested Prescriptions      No prescriptions requested or ordered in this encounter         Encounter Diagnoses   Name Primary?    Neoplasm of uncertain behavior of skin Yes    AK (actinic keratosis)     Benign neoplasm of skin, unspecified location     Lentigo     SK (seborrheic keratosis)     Multiple nevi     History of  nonmelanoma skin cancer         Pt with new non-healing lesion on central chest, history of removals in the past and excessive sun..1.5cm pearly plaque at central chest r/o BCC,   Basal cell carcinoma.  Electrodesiccation and curettage performed.  See operative report, consent.  Cancers nature discussed.  Possibility of recurrence reviewed.  Possibility of scarring reviewed.  Any postoperative problems including bleeding, signs symptoms of infection reviewed.  Postoperative information sheet given.  In particular discussed risk of hypertrophic scarring in this area    Erythematous scaling keratotic papules noted at sites noted on map  Actinic Keratoses.  Precancerous nature discussed. Sun protection, sunscreen/ blocks encouraged Lesions treated with cryo- .  Biopsy if not resolved.    Mid scalp, right templex2    Benign keratoses over the left lateral brow, arms back    Patient with numerous skin cancers previously BCC upper back no recurrence longstanding patient of LSS    Numerous benign-appearing nevi lentigines    No other susupicious lesions on todays  exam.    Please refer to map for specific lesions.  See additional diagnoses.  Pros cons of various therapies, risks benefits discussed.Pathophysiology discussed with patient.  Therapeutic options reviewed.  See  Medications in grid.  Instructions reviewed at length.    Benign nevi, seborrheic  keratoses, cherry angiomas:  Reassurance regarding other benign skin lesions.Signs and symptoms of skin cancer, ABCDE's of melanoma discussed with patient. Sunscreen use, sun protection, self exams reviewed.  Followup as noted RTC ---routine checkup    6 mos -one year or p.r.n.    Encounter Times   Including precharting, reviewing chart, prior notes obtaining history: 10 minutes, medical exam :10 minutes, notes on body map, plan, counseling 10minutes My total time spent caring for the patient on the day of the encounter: 30 minutes     The patient indicates understanding  of these issues and agrees to the plan.  The patient is asked to return as noted in follow-up/ above.    This note was generated using Dragon voice recognition software.  Please contact me regarding any confusion resulting from errors in recognition..  Note to patient and family: The 21st Century Cures Act makes medical notes like these available to patients. However, be advised this is a medical document. It is intended as [id number] communication and monitoring of a patient's care needs. It is written in medical language and may contain abbreviations or verbiage that are unfamiliar. It may appear blunt or direct. Medical documents are intended to carry relevant information, facts as evident and the clinical opinion of the practitioner.

## 2024-02-12 ENCOUNTER — OFFICE VISIT (OUTPATIENT)
Dept: FAMILY MEDICINE CLINIC | Facility: CLINIC | Age: 59
End: 2024-02-12

## 2024-02-12 VITALS
HEIGHT: 68 IN | DIASTOLIC BLOOD PRESSURE: 77 MMHG | BODY MASS INDEX: 30.62 KG/M2 | SYSTOLIC BLOOD PRESSURE: 115 MMHG | WEIGHT: 202 LBS | HEART RATE: 65 BPM

## 2024-02-12 DIAGNOSIS — F32.9 REACTIVE DEPRESSION (SITUATIONAL): ICD-10-CM

## 2024-02-12 PROCEDURE — 90750 HZV VACC RECOMBINANT IM: CPT | Performed by: PHYSICIAN ASSISTANT

## 2024-02-12 PROCEDURE — 3078F DIAST BP <80 MM HG: CPT | Performed by: PHYSICIAN ASSISTANT

## 2024-02-12 PROCEDURE — 3008F BODY MASS INDEX DOCD: CPT | Performed by: PHYSICIAN ASSISTANT

## 2024-02-12 PROCEDURE — 90471 IMMUNIZATION ADMIN: CPT | Performed by: PHYSICIAN ASSISTANT

## 2024-02-12 PROCEDURE — 90472 IMMUNIZATION ADMIN EACH ADD: CPT | Performed by: PHYSICIAN ASSISTANT

## 2024-02-12 PROCEDURE — 90677 PCV20 VACCINE IM: CPT | Performed by: PHYSICIAN ASSISTANT

## 2024-02-12 PROCEDURE — 99213 OFFICE O/P EST LOW 20 MIN: CPT | Performed by: PHYSICIAN ASSISTANT

## 2024-02-12 PROCEDURE — 3074F SYST BP LT 130 MM HG: CPT | Performed by: PHYSICIAN ASSISTANT

## 2024-02-12 RX ORDER — CITALOPRAM 40 MG/1
40 TABLET ORAL DAILY
Qty: 90 TABLET | Refills: 3 | Status: SHIPPED | OUTPATIENT
Start: 2024-02-12

## 2024-02-12 NOTE — PROGRESS NOTES
HPI:     HPI  58 year-old male is here in the office for follow up and refill medication. Patient ran out of Citalopram for couple days. Patient feels anxious, cranky.      Medications:     Current Outpatient Medications   Medication Sig Dispense Refill    citalopram 40 MG Oral Tab Take 1 tablet (40 mg total) by mouth daily. 90 tablet 3       Allergies:   No Known Allergies    History:     Health Maintenance   Topic Date Due    Tobacco Cessation Counseling 1 Year  Never done    Zoster Vaccines (1 of 2) Never done    COVID-19 Vaccine (3 - 2023-24 season) 09/01/2023    Influenza Vaccine (1) 10/01/2023    Annual Physical  12/01/2023    Annual Depression Screening  01/01/2024    Pneumococcal Vaccine: Birth to 64yrs (2 of 2 - PCV) 03/12/2024 (Originally 9/7/2015)    PSA  12/01/2024    DTaP,Tdap,and Td Vaccines (3 - Td or Tdap) 01/03/2027    Colorectal Cancer Screening  03/10/2027       No LMP for male patient.   Past Medical History:     Past Medical History:   Diagnosis Date    Basal cell carcinoma 2014    Right upper lip    Basal cell carcinoma (BCC) of chest 01/19/2024    Superficial Superficial basal cell carcinoma- Central Chest    BCC (basal cell carcinoma) 2021    upper back right of midline    Broken jaw (HCC)     surgical repair    Essential hypertension     Hemochromatosis     per NG: \"NOT Crohn's\"; \"phlebotomy\"    OA (osteoarthritis) of neck        Past Surgical History:     Past Surgical History:   Procedure Laterality Date    Colonoscopy N/A 3/10/2017    Procedure: COLONOSCOPY;  Surgeon: Amie Crzu MD;  Location: [facility] ENDOSCOPY       Family History:     Family History   Problem Relation Age of Onset    Cancer Father         tongue    Cancer Paternal Grandmother         stomach    Alcohol and Other Disorders Associated Other         alcoholism - paternal fam       Social History:     Social History     Socioeconomic History    Marital status: Single     Spouse name: Not on file    Number of children:  Not on file    Years of education: Not on file    Highest education level: Not on file   Occupational History    Not on file   Tobacco Use    Smoking status: Every Day     Packs/day: 1     Types: Cigarettes     Passive exposure: Current    Smokeless tobacco: Never    Tobacco comments:     1 pack a day   Vaping Use    Vaping Use: Never used   Substance and Sexual Activity    Alcohol use: Yes     Comment: Occ    Drug use: No    Sexual activity: Not on file   Other Topics Concern     Service Not Asked    Blood Transfusions Not Asked    Caffeine Concern Not Asked     Comment: coffee, 4cups/day    Occupational Exposure Not Asked    Hobby Hazards Not Asked    Sleep Concern Not Asked    Stress Concern Not Asked    Weight Concern Not Asked    Special Diet Not Asked    Back Care Not Asked    Exercise Not Asked    Bike Helmet Not Asked    Seat Belt Not Asked    Self-Exams Not Asked    Grew up on a farm Not Asked    History of tanning Not Asked    Outdoor occupation Not Asked    Pt has a pacemaker No    Pt has a defibrillator No    Reaction to local anesthetic No   Social History Narrative    Not on file     Social Determinants of Health     Financial Resource Strain: Not on file   Food Insecurity: Not on file   Transportation Needs: Not on file   Physical Activity: Not on file   Stress: Not on file   Social Connections: Not on file   Housing Stability: Not on file       Review of Systems:   Review of Systems   Constitutional:  Negative for activity change, chills, fatigue and fever.   HENT:  Negative for congestion, ear discharge, ear pain, postnasal drip, rhinorrhea, sinus pressure, sinus pain and sore throat.    Respiratory:  Negative for cough, chest tightness, shortness of breath and wheezing.    Cardiovascular:  Negative for chest pain and palpitations.   Gastrointestinal:  Negative for abdominal distention, abdominal pain, blood in stool, constipation, diarrhea, nausea and vomiting.   Skin:  Negative for rash.         Vitals:    02/12/24 1521   BP: 115/77   Pulse: 65   Weight: 202 lb (91.6 kg)   Height: 5' 8\" (1.727 m)     Body mass index is 30.71 kg/m².    Physical Exam:   Physical Exam  Vitals reviewed.   Cardiovascular:      Rate and Rhythm: Normal rate and regular rhythm.      Heart sounds: Normal heart sounds.   Pulmonary:      Effort: Pulmonary effort is normal.      Breath sounds: Normal breath sounds.          Assessment and Plan::     Problem List Items Addressed This Visit          Mental Health    Reactive depression (situational)    Relevant Medications    citalopram 40 MG Oral Tab     Discussed plan of care with pt and pt is in agreement.All questions answered. Pt to call with questions or concerns.    RTC prn for PE and lab work up with Dr Garcia.

## 2025-03-13 ENCOUNTER — OFFICE VISIT (OUTPATIENT)
Dept: FAMILY MEDICINE CLINIC | Facility: CLINIC | Age: 60
End: 2025-03-13

## 2025-03-13 VITALS
BODY MASS INDEX: 30.45 KG/M2 | WEIGHT: 200.88 LBS | DIASTOLIC BLOOD PRESSURE: 89 MMHG | HEART RATE: 61 BPM | SYSTOLIC BLOOD PRESSURE: 137 MMHG | HEIGHT: 68 IN

## 2025-03-13 DIAGNOSIS — Z00.00 ROUTINE PHYSICAL EXAMINATION: Primary | ICD-10-CM

## 2025-03-13 DIAGNOSIS — Z87.891 PERSONAL HISTORY OF TOBACCO USE, PRESENTING HAZARDS TO HEALTH: ICD-10-CM

## 2025-03-13 DIAGNOSIS — F32.9 REACTIVE DEPRESSION (SITUATIONAL): ICD-10-CM

## 2025-03-13 DIAGNOSIS — K59.00 CONSTIPATION, UNSPECIFIED CONSTIPATION TYPE: ICD-10-CM

## 2025-03-13 DIAGNOSIS — E78.5 HYPERLIPIDEMIA, UNSPECIFIED HYPERLIPIDEMIA TYPE: ICD-10-CM

## 2025-03-13 DIAGNOSIS — Z12.11 ENCOUNTER FOR SCREENING COLONOSCOPY: ICD-10-CM

## 2025-03-13 DIAGNOSIS — Z85.828 PERSONAL HISTORY OF SKIN CANCER: ICD-10-CM

## 2025-03-13 PROBLEM — J41.0 SMOKERS' COUGH (HCC): Chronic | Status: ACTIVE | Noted: 2025-03-13

## 2025-03-13 PROCEDURE — 99396 PREV VISIT EST AGE 40-64: CPT | Performed by: FAMILY MEDICINE

## 2025-03-13 PROCEDURE — 90750 HZV VACC RECOMBINANT IM: CPT | Performed by: FAMILY MEDICINE

## 2025-03-13 PROCEDURE — 3075F SYST BP GE 130 - 139MM HG: CPT | Performed by: FAMILY MEDICINE

## 2025-03-13 PROCEDURE — 3008F BODY MASS INDEX DOCD: CPT | Performed by: FAMILY MEDICINE

## 2025-03-13 PROCEDURE — 3079F DIAST BP 80-89 MM HG: CPT | Performed by: FAMILY MEDICINE

## 2025-03-13 PROCEDURE — 90471 IMMUNIZATION ADMIN: CPT | Performed by: FAMILY MEDICINE

## 2025-03-13 RX ORDER — BUPROPION HYDROCHLORIDE 150 MG/1
150 TABLET, EXTENDED RELEASE ORAL DAILY
Qty: 90 TABLET | Refills: 1 | Status: SHIPPED | OUTPATIENT
Start: 2025-03-13

## 2025-03-13 RX ORDER — CITALOPRAM HYDROBROMIDE 40 MG/1
40 TABLET ORAL DAILY
Qty: 90 TABLET | Refills: 3 | Status: SHIPPED | OUTPATIENT
Start: 2025-03-13

## 2025-03-13 NOTE — PROGRESS NOTES
REASON FOR VISIT:    Morgan Jett is a 59 year old male who presents for an Annual Health Assessment.    CONCERNED ABOUT CONSTIPATIONA AND IS MORE IRRITABLE    Patient Active Problem List   Diagnosis    Sebaceous cyst    Actinic keratitis    Personal history of skin cancer    Basal cell carcinoma (BCC) of upper back    Reactive depression (situational)    Smokers' cough (HCC)     General Health           CAGE:           Depression Screening (PHQ-2/PHQ-9):  Over the LAST 2 WEEKS             PREVENTATIVE SERVICES  INDICATIONS AND SCHEDULE Recommendation Internal Lab or Procedure   Colonoscopy Screen Every 10 years Health Maintenance   Topic Date Due    Colorectal Cancer Screening  03/10/2027      Flex Sigmoidoscopy Screen  Every 5 years No results found for this or any previous visit.   Fecal Occult Blood  Annually No results found for: \"FOB\", \"OCCULTSTOOL\"   Obesity Screening Screen all adults annually Body mass index is 30.55 kg/m².     Preventive Services for Which Recommendations Vary with Risk Recommendation Internal Lab or Procedure   Cholesterol Screening Recommended screening varies with age, risk and gender LDL Cholesterol (mg/dL)   Date Value   12/01/2022 135 (H)      Diabetes Screening  If history of high blood pressure or other  risk factors No results found for: \"A1C\"  Glucose (mg/dL)   Date Value   12/01/2022 86        Gonorrhea Screening If high risk No results found for: \"GONOCOCCUS\"   HIV Screening For all adults age 18-65, older adults at increased risk No results found for: \"HIV\"   Syphilis Screening Screen if pregnant or high risk No results found for: \"RPR\"   Hepatitis C Screening Screen pts at high risk plus screen one time for adults born 1945 - 1965 No results found for: \"HCVAB\"   Tuberculosis Screen If high risk No components found for: \"PPDINDURAT\"       SPECIFIC DISEASE MONITORING Internal Lab or Procedure   No disease specific diagnoses    ALLERGIES:   Allergies[1]  CURRENT  MEDICATIONS:   Current Outpatient Medications   Medication Sig Dispense Refill    buPROPion  MG Oral Tablet 12 Hr Take 1 tablet (150 mg total) by mouth daily. 90 tablet 1    citalopram 40 MG Oral Tab Take 1 tablet (40 mg total) by mouth daily. 90 tablet 3      MEDICAL INFORMATION:   Past Medical History:    Basal cell carcinoma    Right upper lip    Basal cell carcinoma (BCC) of chest    Superficial Superficial basal cell carcinoma- Central Chest    BCC (basal cell carcinoma)    upper back right of midline    Broken jaw (HCC)    surgical repair    Essential hypertension    Hemochromatosis    per NG: \"NOT Crohn's\"; \"phlebotomy\"    OA (osteoarthritis) of neck      Past Surgical History:   Procedure Laterality Date    Colonoscopy N/A 3/10/2017    Procedure: COLONOSCOPY;  Surgeon: Aime Cruz MD;  Location: [facility] ENDOSCOPY      Family History   Problem Relation Age of Onset    Cancer Father         tongue    Cancer Paternal Grandmother         stomach    Alcohol and Other Disorders Associated Other         alcoholism - paternal fam     NO FAMILY HISTORY OF PROSTATE OR COLON CANCER     SOCIAL HISTORY:   Social History     Socioeconomic History    Marital status: Single   Tobacco Use    Smoking status: Former     Current packs/day: 0.00     Types: Cigarettes     Quit date:      Years since quittin.1     Passive exposure: Current    Smokeless tobacco: Never    Tobacco comments:     1 pack a day   Vaping Use    Vaping status: Never Used   Substance and Sexual Activity    Alcohol use: Yes     Comment: Occ    Drug use: No   Other Topics Concern    Pt has a pacemaker No    Pt has a defibrillator No    Reaction to local anesthetic No     Occ:  :       REVIEW OF SYSTEMS:   GENERAL: feels well otherwise  SKIN: denies any unusual skin lesions  EYES: denies blurred vision or double vision  HEENT: denies nasal congestion, sinus pain or ST  LUNGS: denies shortness of breath with exertion  CARDIOVASCULAR:  denies chest pain on exertion  GI: denies abdominal pain, denies heartburn  : denies nocturia or changes in stream  MUSCULOSKELETAL: denies back pain  NEURO: denies headaches  PSYCHE: denies depression or anxiety  HEMATOLOGIC: denies hx of anemia  ENDOCRINE: denies thyroid history  ALL/ASTHMA: denies hx of allergy or asthma  NO BLOOD IN STOOL  EXAM:   /89 (BP Location: Left arm, Patient Position: Sitting, Cuff Size: adult)   Pulse 61   Ht 5' 8\" (1.727 m)   Wt 200 lb 14.4 oz (91.1 kg)   BMI 30.55 kg/m²   >   BP Readings from Last 3 Encounters:   03/13/25 137/89   02/12/24 115/77   05/27/23 137/79        GENERAL: well developed, well nourished, in no apparent distress, obese  SKIN: no rashes, no suspicious lesions  HEENT: atraumatic, normocephalic, ears and throat are clear  EYES:PERRLA, EOMI, conjunctiva are clear.    NECK: supple, no adenopathy, no bruits  CHEST: no chest tenderness  LUNGS: clear to auscultation  CARDIO: RRR without murmur  GI: good BS's, no masses, HSM or tenderness  : two descended testes, no masses, no hernia, no penile lesions  RECTAL: deferred  MUSCULOSKELETAL: back is not tender, FROM of the back  EXTREMITIES: no cyanosis, clubbing or edema  NEURO: Oriented times three, cranial nerves are intact, motor and sensory are grossly intact         ASSESSMENT AND OTHER RELEVANT CHRONIC CONDITIONS:   Morgan Jett is a 59 year old male who presents for an Annual Health Assessment.     PLAN SUMMARY:   Diagnoses and all orders for this visit:    Routine physical examination    Personal history of skin cancer  -     [place]    Reactive depression (situational)  -     citalopram 40 MG Oral Tab; Take 1 tablet (40 mg total) by mouth daily.    Hyperlipidemia, unspecified hyperlipidemia type  -     ALT(SGPT); Future  -     AST (SGOT); Future  -     Basic Metabolic Panel (8); Future  -     Lipid Panel; Future  -     TSH W Reflex To Free T4; Future  -     PSA Total,  Screen; Future  -     CT CALCIUM SCORING; Future    Encounter for screening colonoscopy  -     Cancel: Gastro GI Telephone Colon Screen; Future    Personal history of tobacco use, presenting hazards to health  -     CT LUNG LD SCREENING(CPT=71271); Future    Constipation, unspecified constipation type  -     Gastro Referral - [place] ([facility])    Other orders  -     Zoster Recombinant Adjuvanted (Shingrix -Shingles) [90841]  -     buPROPion  MG Oral Tablet 12 Hr; Take 1 tablet (150 mg total) by mouth daily.       The patient indicates understanding of these issues and agrees to the plan.  Return in about 3 weeks (around 4/3/2025).  Exercise counseling perfomed    SUGGESTED VACCINATIONS - Influenza, Pneumococcal, Zoster, Tetanus, HPV   Influenza: Influenza Vaccine(1) due on 10/01/2024  Pneumonia: No recommendations at this time  HPV: No recommendations at this time  Tdap: No recommendations at this time  Shingles: Shingrix shingles vaccine is due    Influenza Annually   Pneumococcal if high risk   Td/Tdap once then every 10 years   HPV Males 11-21   Zoster (Shingles) 60 and older: one dose   Varicella 2 doses if not immune   MMR 1-2 doses if born after 1956 and not immune     1. Routine physical examination  SHINGRIX     2. Personal history of skin cancer  LESION ON RLQ SUSPICIOUS  - Derm Referral - [place] ([place])    3. Reactive depression (situational)  ADD WELLBUTIRN  - citalopram 40 MG Oral Tab; Take 1 tablet (40 mg total) by mouth daily.  Dispense: 90 tablet; Refill: 3    4. Hyperlipidemia, unspecified hyperlipidemia type    - ALT(SGPT); Future  - AST (SGOT); Future  - Basic Metabolic Panel (8); Future  - Lipid Panel; Future  - TSH W Reflex To Free T4; Future  - PSA Total, Screen; Future  - CT CALCIUM SCORING; Future    5. Encounter for screening colonoscopy  WITH CONSTIPATION COMPLAINT    6. Personal history of tobacco use, presenting hazards to health    - CT LUNG LD SCREENING(CPT=71271);  Future    7. Constipation, unspecified constipation type    - Gastro Referral - [place] ([facility])         [1] No Known Allergies

## 2025-03-15 ENCOUNTER — LAB ENCOUNTER (OUTPATIENT)
Dept: LAB | Age: 60
End: 2025-03-15
Attending: FAMILY MEDICINE
Payer: COMMERCIAL

## 2025-03-15 DIAGNOSIS — E78.5 HYPERLIPIDEMIA, UNSPECIFIED HYPERLIPIDEMIA TYPE: ICD-10-CM

## 2025-03-15 LAB
ALT SERPL-CCNC: 19 U/L
ANION GAP SERPL CALC-SCNC: 7 MMOL/L (ref 0–18)
AST SERPL-CCNC: 23 U/L (ref ?–34)
BUN BLD-MCNC: 9 MG/DL (ref 9–23)
BUN/CREAT SERPL: 8.6 (ref 10–20)
CALCIUM BLD-MCNC: 9.2 MG/DL (ref 8.7–10.4)
CHLORIDE SERPL-SCNC: 107 MMOL/L (ref 98–112)
CHOLEST SERPL-MCNC: 237 MG/DL (ref ?–200)
CO2 SERPL-SCNC: 25 MMOL/L (ref 21–32)
COMPLEXED PSA SERPL-MCNC: 2.49 NG/ML (ref ?–4)
CREAT BLD-MCNC: 1.05 MG/DL
EGFRCR SERPLBLD CKD-EPI 2021: 82 ML/MIN/1.73M2 (ref 60–?)
FASTING PATIENT LIPID ANSWER: NO
FASTING STATUS PATIENT QL REPORTED: NO
GLUCOSE BLD-MCNC: 103 MG/DL (ref 70–99)
HDLC SERPL-MCNC: 55 MG/DL (ref 40–59)
LDLC SERPL CALC-MCNC: 163 MG/DL (ref ?–100)
NONHDLC SERPL-MCNC: 182 MG/DL (ref ?–130)
OSMOLALITY SERPL CALC.SUM OF ELEC: 287 MOSM/KG (ref 275–295)
POTASSIUM SERPL-SCNC: 4 MMOL/L (ref 3.5–5.1)
SODIUM SERPL-SCNC: 139 MMOL/L (ref 136–145)
T3FREE SERPL-MCNC: 2.73 PG/ML (ref 2.4–4.2)
T4 FREE SERPL-MCNC: 1.1 NG/DL (ref 0.8–1.7)
TRIGL SERPL-MCNC: 108 MG/DL (ref 30–149)
TSI SER-ACNC: 0.45 UIU/ML (ref 0.55–4.78)
VLDLC SERPL CALC-MCNC: 21 MG/DL (ref 0–30)

## 2025-03-15 PROCEDURE — 84481 FREE ASSAY (FT-3): CPT

## 2025-03-15 PROCEDURE — 84439 ASSAY OF FREE THYROXINE: CPT

## 2025-03-15 PROCEDURE — 80061 LIPID PANEL: CPT

## 2025-03-15 PROCEDURE — 84460 ALANINE AMINO (ALT) (SGPT): CPT

## 2025-03-15 PROCEDURE — 84443 ASSAY THYROID STIM HORMONE: CPT

## 2025-03-15 PROCEDURE — 36415 COLL VENOUS BLD VENIPUNCTURE: CPT

## 2025-03-15 PROCEDURE — 84450 TRANSFERASE (AST) (SGOT): CPT

## 2025-03-15 PROCEDURE — 80048 BASIC METABOLIC PNL TOTAL CA: CPT

## 2025-03-18 ENCOUNTER — TELEPHONE (OUTPATIENT)
Facility: CLINIC | Age: 60
End: 2025-03-18

## 2025-03-18 ENCOUNTER — OFFICE VISIT (OUTPATIENT)
Dept: GASTROENTEROLOGY | Facility: CLINIC | Age: 60
End: 2025-03-18

## 2025-03-18 VITALS
DIASTOLIC BLOOD PRESSURE: 80 MMHG | SYSTOLIC BLOOD PRESSURE: 120 MMHG | WEIGHT: 195 LBS | HEIGHT: 68 IN | BODY MASS INDEX: 29.55 KG/M2

## 2025-03-18 DIAGNOSIS — K59.09 CHRONIC CONSTIPATION: Primary | ICD-10-CM

## 2025-03-18 PROCEDURE — 99204 OFFICE O/P NEW MOD 45 MIN: CPT | Performed by: STUDENT IN AN ORGANIZED HEALTH CARE EDUCATION/TRAINING PROGRAM

## 2025-03-18 PROCEDURE — 3074F SYST BP LT 130 MM HG: CPT | Performed by: STUDENT IN AN ORGANIZED HEALTH CARE EDUCATION/TRAINING PROGRAM

## 2025-03-18 PROCEDURE — 3079F DIAST BP 80-89 MM HG: CPT | Performed by: STUDENT IN AN ORGANIZED HEALTH CARE EDUCATION/TRAINING PROGRAM

## 2025-03-18 PROCEDURE — 3008F BODY MASS INDEX DOCD: CPT | Performed by: STUDENT IN AN ORGANIZED HEALTH CARE EDUCATION/TRAINING PROGRAM

## 2025-03-18 NOTE — PATIENT INSTRUCTIONS
1) Start SmoothLax (miralax) 2 capfuls per day.  2) Increase water consumption -- greater than 40oz (at minimum) per day.  3) Try to add prunes or kiwi fruit to the diet.  4) Telemedicine visit at 3:30 on 4/22

## 2025-03-18 NOTE — H&P
[facility] - Gastroenterology                                                                                                               Reason for consult: Constipation    Requesting physician or provider: Herbie Garcia DO    Chief Complaint   Patient presents with    Constipation     Started about a month; tried OTC meds        HPI:   Morgan Jett is a 59 year old year-old man with history of hypertension who presents to GI clinic to discuss constipation.    Patient had a colonoscopy in 2017 which was negative/normal.    He denies any new medications or dietary changes.  He does report working less recently and has been more inactive/sedentary.  He spends a lot of time on the couch watching TV whereas before he was working every day for quite some time.    He only drinks approximately 1 glass of water per day.  No other changes.    He denies blood in the stool.  Denies abdominal pain though does feel bloated at times when he goes a day without a bowel movement.    When he does feel bloated and has a bowel movement his symptoms resolved.    He has not taken a consistent laxative.    Prior endoscopies:  Colonoscopy 2017 (Dr. Cruz) -- 10 year recall given    Soc:  -former smoking  -rare Etoh    Wt Readings from Last 6 Encounters:   03/18/25 195 lb (88.5 kg)   03/13/25 200 lb 14.4 oz (91.1 kg)   02/12/24 202 lb (91.6 kg)   05/27/23 195 lb (88.5 kg)   12/01/22 194 lb (88 kg)   08/25/20 188 lb (85.3 kg)        History, Medications, Allergies, ROS:      Past Medical History:    Basal cell carcinoma    Right upper lip    Basal cell carcinoma (BCC) of chest    Superficial Superficial basal cell carcinoma- Central Chest    BCC (basal cell carcinoma)    upper back right of midline    Broken jaw (HCC)    surgical repair    Essential hypertension    Hemochromatosis    per NG: \"NOT Crohn's\"; \"phlebotomy\"    OA  (osteoarthritis) of neck      Past Surgical History:   Procedure Laterality Date    Colonoscopy N/A 3/10/2017    Procedure: COLONOSCOPY;  Surgeon: Aime Cruz MD;  Location: [facility] ENDOSCOPY      Family Hx:   Family History   Problem Relation Age of Onset    Cancer Father         tongue    Cancer Paternal Grandmother         stomach    Alcohol and Other Disorders Associated Other         alcoholism - paternal fam      Social History:   Social History     Socioeconomic History    Marital status: Single   Tobacco Use    Smoking status: Former     Current packs/day: 0.00     Types: Cigarettes     Quit date:      Years since quittin.2     Passive exposure: Current    Smokeless tobacco: Never    Tobacco comments:     1 pack a day   Vaping Use    Vaping status: Never Used   Substance and Sexual Activity    Alcohol use: Not Currently     Comment: Occ    Drug use: No   Other Topics Concern    Pt has a pacemaker No    Pt has a defibrillator No    Reaction to local anesthetic No        Medications (Active prior to today's visit):  Current Outpatient Medications   Medication Sig Dispense Refill    buPROPion  MG Oral Tablet 12 Hr Take 1 tablet (150 mg total) by mouth daily. 90 tablet 1    citalopram 40 MG Oral Tab Take 1 tablet (40 mg total) by mouth daily. 90 tablet 3       Allergies:  Allergies[1]    ROS:   CONSTITUTIONAL:  negative for fevers, rigors  EYES:  negative for diplopia   RESPIRATORY:  negative for severe shortness of breath  CARDIOVASCULAR:  negative for crushing sub-sternal chest pain  GASTROINTESTINAL:  see HPI  GENITOURINARY:  negative for dysuria or gross hematuria  INTEGUMENT/BREAST:  SKIN:  negative for jaundice   ALLERGIC/IMMUNOLOGIC:  negative for hay fever  ENDOCRINE:  negative for cold intolerance and heat intolerance  MUSCULOSKELETAL:  negative for joint effusion/severe erythema  BEHAVIOR/PSYCH:  negative for psychotic behavior      PHYSICAL EXAM:   Blood pressure 120/80, height 5' 8\"  (1.727 m), weight 195 lb (88.5 kg).    Gen- Patient appears comfortable and in no acute discomfort  HEENT: the sclera appears anicteric, oropharynx clear, mucus membranes appear moist  CV- regular rate and rhythm, the extremities are warm and well perfused   Lung- Moves air well; No labored breathing  Abdomen- soft, non-tender exam in all quadrants without rigidity or guarding, non-distended  Skin- No jaundice  Ext: no edema is evident.   Neuro- Alert and interactive, and gross movements of extremities normal  Psych - appropriate, non-agitated    Labs/Imaging:     Patient's pertinent labs and imaging were reviewed and discussed with patient today.      .  ASSESSMENT/PLAN:   Morgan Jett is a 59 year old year-old man with history of hypertension who presents to GI clinic to discuss constipation.    #Constipation  Likely related to lifestyle considering he has been sedentary recently with minimal water consumption.  No new medications or change in diet.  No associated red flag symptoms.  He will go 2 to 3 days without a bowel movement and this is associated with bloating.  When he has a bowel movement after taking a laxative his abdominal bloating goes away.  He denies presence of blood in the stool.  He continues have a good appetite and denies nausea, vomiting, dysphagia.    We discussed increasing water consumption times a day as well as adding prune/kiwi fruit to the diet.  We will also increase water consumption to greater than 40 ounces per day.  We will try consistent use of MiraLAX 2 capfuls per day for now and see how he responds.  Should symptoms not improve we will consider stepping up therapy to Linzess.    Recommendations:  1) Start SmoothLax (miralax) 2 capfuls per day.  2) Increase water consumption -- greater than 40oz (at minimum) per day.  3) Try to add prunes or kiwi fruit to the diet.  4) Telemedicine visit at 3:30 on 4/22    #Colorectal cancer screening  -Last colonoscopy 2017 normal  -Due  2027    Orders This Visit:  No orders of the defined types were placed in this encounter.      Meds This Visit:  Requested Prescriptions      No prescriptions requested or ordered in this encounter       Imaging & Referrals:  None         Jeancarlos Martinez MD  [facility] Gastroenterology  3/18/2025      This note was partially prepared using Dragon Medical voice recognition dictation software. As a result, errors may occur. When identified, these errors have been corrected. While every attempt is made to correct errors during dictation, discrepancies may still exist.          [1] No Known Allergies

## 2025-03-20 NOTE — TELEPHONE ENCOUNTER
Tuesday April 22, 2025 3:30 PM  Virtual Phone Visit with Jeancarlos Martinez MD  [address] [address] [phone]   You have been scheduled for a Virtual Telephone visit with your provider. Please be available at your phone so that your physician can contact you, and be prepared with any questions or concerns. As always, your health is our priority.     We suggest confirming with your insurance regarding coverage prior to your appointment as some payors may no longer cover telephone visits. Depending on your insurance you may also be billed a copay at a later time.

## 2025-07-23 ENCOUNTER — HOSPITAL ENCOUNTER (OUTPATIENT)
Dept: CT IMAGING | Facility: HOSPITAL | Age: 60
Discharge: HOME | End: 2025-07-23
Attending: STUDENT IN AN ORGANIZED HEALTH CARE EDUCATION/TRAINING PROGRAM
Payer: COMMERCIAL

## 2025-07-23 ENCOUNTER — OFFICE VISIT (OUTPATIENT)
Facility: CLINIC | Age: 60
End: 2025-07-23

## 2025-07-23 VITALS
HEART RATE: 57 BPM | SYSTOLIC BLOOD PRESSURE: 141 MMHG | WEIGHT: 190 LBS | DIASTOLIC BLOOD PRESSURE: 85 MMHG | HEIGHT: 68 IN | BODY MASS INDEX: 28.79 KG/M2

## 2025-07-23 DIAGNOSIS — R19.4 ALTERED BOWEL HABITS: ICD-10-CM

## 2025-07-23 DIAGNOSIS — R63.4 WEIGHT LOSS: ICD-10-CM

## 2025-07-23 DIAGNOSIS — R63.4 WEIGHT LOSS: Primary | ICD-10-CM

## 2025-07-23 LAB
CREAT BLD-MCNC: 1 MG/DL (ref 0.7–1.3)
EGFRCR SERPLBLD CKD-EPI 2021: 87 ML/MIN/1.73M2 (ref 60–?)

## 2025-07-23 PROCEDURE — 99214 OFFICE O/P EST MOD 30 MIN: CPT | Performed by: STUDENT IN AN ORGANIZED HEALTH CARE EDUCATION/TRAINING PROGRAM

## 2025-07-23 PROCEDURE — 74177 CT ABD & PELVIS W/CONTRAST: CPT | Performed by: STUDENT IN AN ORGANIZED HEALTH CARE EDUCATION/TRAINING PROGRAM

## 2025-07-23 PROCEDURE — 82565 ASSAY OF CREATININE: CPT

## 2025-07-23 PROCEDURE — 3077F SYST BP >= 140 MM HG: CPT | Performed by: STUDENT IN AN ORGANIZED HEALTH CARE EDUCATION/TRAINING PROGRAM

## 2025-07-23 PROCEDURE — 3079F DIAST BP 80-89 MM HG: CPT | Performed by: STUDENT IN AN ORGANIZED HEALTH CARE EDUCATION/TRAINING PROGRAM

## 2025-07-23 PROCEDURE — 3008F BODY MASS INDEX DOCD: CPT | Performed by: STUDENT IN AN ORGANIZED HEALTH CARE EDUCATION/TRAINING PROGRAM

## 2025-07-23 NOTE — PROGRESS NOTES
[facility] - Gastroenterology                                                                                                      Clinic Follow-up Visit    Chief Complaint   Patient presents with    Constipation     Ongoing issue. Medication given last visit not improving condition.     Subjective/HPI:        Morgan Jett is a 59 year old male who presents with chronic constipation and bloating.    He has experienced chronic constipation and bloating for several months, with symptoms persisting since at least January. Bowel movements are infrequent and watery, often requiring laxatives like Exlax to induce relief, resulting in watery stools. He feels significant relief after these episodes, describing a sensation of being 'a hundred pounds lighter.'    Various treatments have been attempted, including Smoothlax, Exlax, and apple cider vinegar. Smoothlax initially worked but resulted in watery stools. Exlax is used intermittently to induce bowel movements after several days of constipation. Apple cider vinegar has been used regularly, and he reports a weight loss of ten pounds since starting it. Despite these interventions, bloating and discomfort persist unless he uses laxatives.    His diet includes bananas, apples, cereal, and eggs, with increased water consumption. He has not tried fiber supplements like Metamucil due to concerns about potential blockage. He has a good appetite but is cautious about eating too much due to constipation issues.    Colonoscopy 2017 was negative for adenoma polyps -- given 10 year recall. No blood in stool is noted. He reports occasional lower back pain, which he notes occurs when getting up and may be related to sitting for long periods and reduced physical activity.    Current medications include citalopram, with no recent changes in medication or lifestyle that could account for  the change in bowel habits. No recent antibiotic use is reported. Concerns about insurance running out in a few months are expressed, with eagerness to resolve symptoms before then.    Wt Readings from Last 6 Encounters:   07/23/25 190 lb (86.2 kg)   03/18/25 195 lb (88.5 kg)   03/13/25 200 lb 14.4 oz (91.1 kg)   02/12/24 202 lb (91.6 kg)   05/27/23 195 lb (88.5 kg)   12/01/22 194 lb (88 kg)        History, Medications, Allergies, ROS:      Past Medical History[1]   Past Surgical History[2]   Family History[3]   Social History: Short Social Hx on File[4]     Medications (Active prior to today's visit):  Current Medications[5]    Allergies:  Allergies[6]    ROS:   CONSTITUTIONAL:  negative for fevers, chills  EYES:  negative for change in vision  RESPIRATORY:  negative for  shortness of breath  CARDIOVASCULAR:  negative for  chest pain  GASTROINTESTINAL:  see HPI  GENITOURINARY:  negative for dysuria  INTEGUMENT/BREAST:  SKIN:  negative for  rash  ALLERGIC/IMMUNOLOGIC:  negative for hay fever  ENDOCRINE:  negative for cold intolerance and heat intolerance  MUSCULOSKELETAL:  negative for  joint stiffness and joint swelling  BEHAVIOR/PSYCH:  negative for depressed mood    PHYSICAL EXAM:   Blood pressure 141/85, pulse 57, height 5' 8\" (1.727 m), weight 190 lb (86.2 kg).    Gen- Patient appears comfortable and in no acute discomfort  HEENT: the sclera appears anicteric, oropharynx clear, mucus membranes appear moist  CV- regular rate and rhythm, the extremities are warm and well perfused   Lung- Moves air well; No labored breathing  Abdomen- soft, non-tender exam in all quadrants without rigidity or guarding, non-distended, no masses are palpated  Skin- No jaundice  Ext: no edema is evident.   Neuro- Alert and oriented x4, and patient is having movement of all 4 extremities   Psych - appropriate, non-agitated    Labs/Imaging:     Patient's labs and imaging were reviewed and discussed with patient today.      ASSESSMENT/PLAN:   Morgan Jett is a 59 year old male who presents with chronic constipation and bloating.    #Constipation  -Rather new onset starting 1/2025. No new medications, no change in diet. Less working so did become more sedentary which may be contributing. Denies blood in stool or family hx of colon cancer.  -Colonoscopy 2017 --> given 10 year recall.  -TSH was low but Free T4/T3 normal.  -Has increased water consumption, increased consumption of prunes and tried miralax. This does work but resulted in soft/watery stool.  -Has lost 10-15 pounds (more than 5% body weight) over the last 6 months.    Plan:  -CT A/P with oral and IV contrast to rule out mass causing change in bowel habits  -May trial fiber supplementation for bulk forming effect though I worry this may further contribute bloating.  -Consider low dose linzess given sub-optimal response to other OTC laxatives.  -Consider 14 day course of Rifaximin.    #Colon cancer screening  -Colonoscopy 2017, given 10 year recall.    Orders This Visit:  No orders of the defined types were placed in this encounter.      Meds This Visit:  Requested Prescriptions      No prescriptions requested or ordered in this encounter       Imaging & Referrals:  CT ABDOMEN+PELVIS(CONTRAST ONLY)(XCZ=[id number])     Jeancarlos Martinez MD  [facility] Gastroenterology  7/23/2025       [1]   Past Medical History:   Basal cell carcinoma    Right upper lip    Basal cell carcinoma (BCC) of chest    Superficial Superficial basal cell carcinoma- Central Chest    BCC (basal cell carcinoma)    upper back right of midline    Broken jaw (HCC)    surgical repair    Essential hypertension    Hemochromatosis    per NG: \"NOT Crohn's\"; \"phlebotomy\"    OA (osteoarthritis) of neck   [2]   Past Surgical History:  Procedure Laterality Date    Colonoscopy N/A 3/10/2017    Procedure: COLONOSCOPY;  Surgeon: Aime Cruz MD;  Location: [facility] ENDOSCOPY   [3]   Family History  Problem  Relation Age of Onset    Cancer Father         tongue    Cancer Paternal Grandmother         stomach    Alcohol and Other Disorders Associated Other         alcoholism - paternal fam   [4]   Social History  Socioeconomic History    Marital status: Single   Tobacco Use    Smoking status: Former     Current packs/day: 0.00     Types: Cigarettes     Quit date:      Years since quittin.5     Passive exposure: Current    Smokeless tobacco: Never    Tobacco comments:     1 pack a day   Vaping Use    Vaping status: Never Used   Substance and Sexual Activity    Alcohol use: Not Currently     Comment: Occ    Drug use: No   Other Topics Concern    Pt has a pacemaker No    Pt has a defibrillator No    Reaction to local anesthetic No   [5]   Current Outpatient Medications   Medication Sig Dispense Refill    citalopram 40 MG Oral Tab Take 1 tablet (40 mg total) by mouth daily. 90 tablet 3    buPROPion  MG Oral Tablet 12 Hr Take 1 tablet (150 mg total) by mouth daily. (Patient not taking: Reported on 2025) 90 tablet 1   [6] No Known Allergies

## 2025-07-24 ENCOUNTER — RESULTS FOLLOW-UP (OUTPATIENT)
Facility: CLINIC | Age: 60
End: 2025-07-24

## 2025-07-24 NOTE — TELEPHONE ENCOUNTER
Start Metamucil daily.  Use Magnesium citrate tablet 250mg to 400 mg nightly.    If no response then will give trulance/linzess.    CT scan reviewed.

## (undated) DEVICE — ENDOSCOPY PACK - LOWER: Brand: MEDLINE INDUSTRIES, INC.

## (undated) DEVICE — SNARE CAPTIFLEX MICRO-OVL OLY

## (undated) DEVICE — Device: Brand: DEFENDO AIR/WATER/SUCTION AND BIOPSY VALVE

## (undated) DEVICE — SPECIMEN TRAP LUKI

## (undated) NOTE — MR AVS SNAPSHOT
[facility] SPECIALTY [facility] - [place] [address] [address]  [address] [phone]               Thank you for choosing us for your health care visit with Keisha Benson DO.   We are glad to serve you and happy to provide you with this summary of Now link in the [employer]. Enter your [employer] Activation Code exactly as it appears below along with your Zip Code and Date of Birth to complete the sign-up process. If you do not sign up before the expiration date, you must request a new code.     Gracie Camargo

## (undated) NOTE — MR AVS SNAPSHOT
[facility]  [address] [place] [id number] [phone]               Thank you for choosing us for your health care visit with Robbi Souza. Luke Shrestha MD.  We are glad to serve you and happy to provide you with this summary of your visit. not sign up before the expiration date, you must request a new code. Your unique [employer] Access Code: [email or web address]  Expires: 3/4/2017  4:46 PM    If you have questions, you can call [phone] to talk to our [facility] Staff.  Remember, Fabiana tang

## (undated) NOTE — IP AVS SNAPSHOT
[facility]    [address], [place], [place] ~ [phone]                Discharge Summary   3/10/2017    [facility]           Admission Information        Provider Department    3/10/2017 Omid Herrera 55  Trent Brito MD [facility] - If you do not hear from your doctor's office within two weeks of your biopsy, please call them for your results.     Discharge References/Attachments     COLON AND RECTAL POLYPS, UNDERSTANDING (ENGLISH)    DIVERTICULOSIS AND DIVERTICULITIS, UNDERSTANDING your Zip Code and Date of Birth to complete the sign-up process. If you do not sign up before the expiration date, you must request a new code.     Your unique [employer] Access Code: [email or web address]  Expires: 5/9/2017 10:02 AM    If you have questions, you can ca